# Patient Record
Sex: FEMALE | Race: WHITE | NOT HISPANIC OR LATINO | Employment: OTHER | URBAN - METROPOLITAN AREA
[De-identification: names, ages, dates, MRNs, and addresses within clinical notes are randomized per-mention and may not be internally consistent; named-entity substitution may affect disease eponyms.]

---

## 2017-02-22 ENCOUNTER — APPOINTMENT (OUTPATIENT)
Dept: WOMENS IMAGING | Facility: HOSPITAL | Age: 67
End: 2017-02-22

## 2017-02-22 PROCEDURE — 77067 SCR MAMMO BI INCL CAD: CPT | Performed by: RADIOLOGY

## 2017-02-22 PROCEDURE — G0202 SCR MAMMO BI INCL CAD: HCPCS | Performed by: RADIOLOGY

## 2018-02-27 ENCOUNTER — APPOINTMENT (OUTPATIENT)
Dept: WOMENS IMAGING | Facility: HOSPITAL | Age: 68
End: 2018-02-27

## 2018-02-27 PROCEDURE — 77063 BREAST TOMOSYNTHESIS BI: CPT | Performed by: RADIOLOGY

## 2018-02-27 PROCEDURE — 77067 SCR MAMMO BI INCL CAD: CPT | Performed by: RADIOLOGY

## 2019-02-28 ENCOUNTER — APPOINTMENT (OUTPATIENT)
Dept: WOMENS IMAGING | Facility: HOSPITAL | Age: 69
End: 2019-02-28

## 2019-02-28 PROCEDURE — 77067 SCR MAMMO BI INCL CAD: CPT | Performed by: RADIOLOGY

## 2019-02-28 PROCEDURE — 77063 BREAST TOMOSYNTHESIS BI: CPT | Performed by: RADIOLOGY

## 2020-03-02 ENCOUNTER — APPOINTMENT (OUTPATIENT)
Dept: WOMENS IMAGING | Facility: HOSPITAL | Age: 70
End: 2020-03-02

## 2020-03-02 PROCEDURE — 77063 BREAST TOMOSYNTHESIS BI: CPT | Performed by: RADIOLOGY

## 2020-03-02 PROCEDURE — 77067 SCR MAMMO BI INCL CAD: CPT | Performed by: RADIOLOGY

## 2021-03-12 ENCOUNTER — OFFICE VISIT (OUTPATIENT)
Dept: NEUROLOGY | Facility: CLINIC | Age: 71
End: 2021-03-12

## 2021-03-12 VITALS
SYSTOLIC BLOOD PRESSURE: 148 MMHG | BODY MASS INDEX: 24.92 KG/M2 | HEART RATE: 77 BPM | WEIGHT: 178 LBS | OXYGEN SATURATION: 97 % | DIASTOLIC BLOOD PRESSURE: 72 MMHG | HEIGHT: 71 IN

## 2021-03-12 DIAGNOSIS — R20.2 NUMBNESS AND TINGLING OF LEFT ARM AND LEG: Primary | ICD-10-CM

## 2021-03-12 DIAGNOSIS — R20.0 NUMBNESS AND TINGLING OF LEFT ARM AND LEG: Primary | ICD-10-CM

## 2021-03-12 PROCEDURE — 99203 OFFICE O/P NEW LOW 30 MIN: CPT | Performed by: PSYCHIATRY & NEUROLOGY

## 2021-03-12 RX ORDER — LEVOTHYROXINE SODIUM 0.05 MG/1
TABLET ORAL
COMMUNITY
Start: 2020-12-14

## 2021-03-12 RX ORDER — METOPROLOL SUCCINATE 50 MG/1
TABLET, EXTENDED RELEASE ORAL
COMMUNITY
Start: 2020-11-30

## 2021-03-12 RX ORDER — MELATONIN
1000 DAILY
COMMUNITY

## 2021-03-12 NOTE — PROGRESS NOTES
Chief Complaint  Neurologic Problem (feels tingling all over, began about a year ago, reports having hip and ankle work completed, hasnt had any testing)    Subjective     {CC  Problem List  Visit Diagnosis   Encounters  Notes  Medications  Labs  Result Review Imaging  Media :23}     Nelida Dye presents to [facility]UR[facility] for   HISTORY OF PRESENT ILLNESS:    Nelida Dye is a 70 year old right handed woman who presents to neurology clinic for initial evaluation and treatment of tingling.  She tells me her symptoms started over a year ago with tingling in the left leg mostly and also in the left arm which started around 11/2020.  She has had hip replacement in 2001.  She has also had plates and pins in her left ankle in 2008.  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  She tells me she was doing a lot of lifting in 11/2020.  She denies history of migraines. She tells me she has pain across her shoulder and in her neck due to how she is sitting and working with the children. She denies a shooting, radiating pain down from her neck down her arms.  She has tingling but not shooting pain in her left leg more so than her right leg.  She denies history of diabetes.  She does have hypothyroidism and is on levothyroxine.  She has had lab work including CMP, HgbA1c, TSH, Vit B12 and Vit D which looked good.      Past Medical History:   Diagnosis Date   • Asthma         History reviewed. No pertinent family history.     Social History     Socioeconomic History   • Marital status:      Spouse name: Not on file   • Number of children: Not on file   • Years of education: Not on file   • Highest education level: Not on file   Tobacco Use   • Smoking status: Never Smoker   Vaping Use   • Vaping Use: Never used   Substance and Sexual Activity   • Drug use: Never   • Sexual activity: Defer        I have  "personally reviewed the ROS as stated below.     Review of Systems   Constitutional: Positive for fatigue. Negative for activity change and appetite change.   HENT: Negative for hearing loss, trouble swallowing and voice change.    Eyes: Negative for blurred vision, double vision and pain.   Respiratory: Negative for choking, shortness of breath and wheezing.    Cardiovascular: Negative for chest pain, palpitations and leg swelling.   Gastrointestinal: Negative for abdominal pain, nausea, rectal pain and vomiting.   Endocrine: Negative for cold intolerance, heat intolerance and polydipsia.   Genitourinary: Positive for urgency. Negative for decreased urine volume and urinary incontinence.   Musculoskeletal: Negative for back pain, gait problem and neck pain.   Skin: Negative for dry skin, rash and bruise.   Allergic/Immunologic: Positive for environmental allergies (walnut,mold,dust). Negative for food allergies.   Neurological: Positive for numbness (hands/fingers) and headache. Negative for dizziness, tremors, seizures, syncope, facial asymmetry, speech difficulty, weakness, light-headedness, memory problem and confusion.   Hematological: Does not bruise/bleed easily.   Psychiatric/Behavioral: Negative for agitation, behavioral problems, decreased concentration, dysphoric mood, hallucinations, self-injury, sleep disturbance, suicidal ideas, negative for hyperactivity, depressed mood and stress. The patient is not nervous/anxious.         Objective   Vital Signs:   /72   Pulse 77   Ht 180.3 cm (71\")   Wt 80.7 kg (178 lb)   SpO2 97%   BMI 24.83 kg/m²       PHYSICAL EXAM:    General   Mental Status - Alert. General Appearance - Well developed, Well groomed, Oriented and Cooperative. Orientation - Oriented X3.       Head and Neck  Head - normocephalic, atraumatic with no lesions or palpable masses.  Neck    Global Assessment - supple.       Eye   Sclera/Conjunctiva - Bilateral - Normal.    ENMT  Mouth and " Throat   Oral Cavity/Oropharynx: Oropharynx - the soft palate,uvula and tongue are normal in appearance.    Chest and Lung Exam   Chest - lung clear to auscultation bilaterally.    Cardiovascular   Cardiovascular examination reveals  - normal heart sounds, regular rate and rhythm.    Neurologic   Mental Status: Speech - Normal. Cognitive function - appropriate fund of knowledge. No impairment of attention, Impairment of concentration, impairment of long term memory or impairment of short term memory.  Cranial Nerves:   II Optic: Visual acuity - Left - Normal. Right - Normal. Visual fields - Normal (to confrontation).  III Oculomotor: Pupillary constriction - Left - Normal. Right - Normal.  VII Facial: - Normal Bilaterally.  VIII Acoustic - Bilateral - Hearing normal and (Hearing tested by finger rub).   IX Glossopharyngeal / X Vagus - Normal.  XI Accessory: Trapezius - Bilateral - Normal. Sternocleidomastoid - Bilateral - Normal.  XII Hypoglossal - Bilateral - Normal.  Eye Movements: - Normal Bilaterally.  Sensory:   Light Touch: Intact - Globally.  Motor:   Bulk and Contour: - Normal.  Tone: - Normal.  Tremor: Not present.  Strength: 5/5 normal muscle strength - All Muscles.   General Assessment of Reflexes: - deep tendon reflexes are normal. Coordination - No Impairment of finger-to-nose or Impairment of rapid alternating movements. Gait - Normal.       Result Review :                 Assessment and Plan    Problem List Items Addressed This Visit        Symptoms and Signs    Numbness and tingling of left arm and leg - Primary    Current Assessment & Plan     70 year old woman with left sided numbness and tingling including left arm and left leg.  I will check NCS of her left arm and left leg for further evaluation of potential nerve damage causing her symptoms.  Otherwise this maybe stress/anxiety related.  She has had appropriate lab work done which I reviewed and looks good.  Will follow up after the testing.                  I spent 33 minutes caring for Nelida on this date of service. This time includes time spent by me in the following activities:preparing for the visit, reviewing tests, obtaining and/or reviewing a separately obtained history, performing a medically appropriate examination and/or evaluation , counseling and educating the patient/family/caregiver, ordering medications, tests, or procedures, documenting information in the medical record and care coordination    Follow Up   No follow-ups on file.  Patient was given instructions and counseling regarding her condition or for health maintenance advice. Please see specific information pulled into the AVS if appropriate.

## 2021-03-12 NOTE — ASSESSMENT & PLAN NOTE
70 year old woman with left sided numbness and tingling including left arm and left leg.  I will order a NCS of her left arm and left leg for further evaluation of potential nerve damage causing her symptoms.  Otherwise this maybe stress/anxiety related.  She has had appropriate lab work done which I reviewed and looks good.  Will follow up after the testing.

## 2021-03-22 ENCOUNTER — BULK ORDERING (OUTPATIENT)
Dept: CASE MANAGEMENT | Facility: OTHER | Age: 71
End: 2021-03-22

## 2021-03-22 DIAGNOSIS — Z23 IMMUNIZATION DUE: ICD-10-CM

## 2021-04-12 ENCOUNTER — APPOINTMENT (OUTPATIENT)
Dept: WOMENS IMAGING | Facility: HOSPITAL | Age: 71
End: 2021-04-12

## 2021-04-12 PROCEDURE — 77067 SCR MAMMO BI INCL CAD: CPT | Performed by: RADIOLOGY

## 2021-04-12 PROCEDURE — 77063 BREAST TOMOSYNTHESIS BI: CPT | Performed by: RADIOLOGY

## 2021-04-20 DIAGNOSIS — R20.0 NUMBNESS AND TINGLING OF LEFT ARM AND LEG: Primary | ICD-10-CM

## 2021-04-20 DIAGNOSIS — R20.2 NUMBNESS AND TINGLING OF LEFT ARM AND LEG: Primary | ICD-10-CM

## 2021-06-04 ENCOUNTER — APPOINTMENT (OUTPATIENT)
Dept: INFUSION THERAPY | Facility: HOSPITAL | Age: 71
End: 2021-06-04

## 2021-07-09 ENCOUNTER — HOSPITAL ENCOUNTER (OUTPATIENT)
Dept: INFUSION THERAPY | Facility: HOSPITAL | Age: 71
Discharge: HOME | End: 2021-07-09
Admitting: PSYCHIATRY & NEUROLOGY

## 2021-07-09 DIAGNOSIS — R20.0 NUMBNESS AND TINGLING OF LEFT ARM AND LEG: ICD-10-CM

## 2021-07-09 DIAGNOSIS — R20.2 NUMBNESS AND TINGLING OF LEFT ARM AND LEG: ICD-10-CM

## 2021-07-09 PROCEDURE — 95886 MUSC TEST DONE W/N TEST COMP: CPT | Performed by: PSYCHIATRY & NEUROLOGY

## 2021-07-09 PROCEDURE — 95886 MUSC TEST DONE W/N TEST COMP: CPT

## 2021-07-09 PROCEDURE — 95911 NRV CNDJ TEST 9-10 STUDIES: CPT

## 2021-07-09 PROCEDURE — 95911 NRV CNDJ TEST 9-10 STUDIES: CPT | Performed by: PSYCHIATRY & NEUROLOGY

## 2021-07-09 NOTE — PROCEDURES
EMG and Nerve Conduction Studies    I.      Instrument used: Neuromax 1002  II.     Please see data sheets for tabular summary of NCS and details on methods, temperatures and lab standards.   III.    EMG muscles tested for upper extremity studies include the deltoid, biceps, triceps, pronator teres, extensor digitorum communis, first dorsal interosseous and abductor pollicis brevis.    IV.   EMG muscles tested for lower extremity studies include the vastus lateralis, tibialis anterior, peroneus longus, medial gastrocnemius and extensor digitorum brevis.    V.    Additional muscles tested as needed.  Paraspinal muscles tested as needed.   VI.   Please see data sheets for tabular summary of EMG findings.   VII. The complete report includes the data sheets.      Indication: Paresthesias in the left arm and left leg  History: 70-year-old white female with intermittent numbness and tingling in the left arm and left leg.  The numbness is mostly in the fingers otherwise tingling is in other locations.  She points to the elbow area, volar aspect as well as in the left leg from the knee down      Ht: 180.3 cm  Wt: 80.7 kg  HbA1C:   Lab Results   Component Value Date    HGBA1C 5.1 06/29/2021     TSH:   Lab Results   Component Value Date    TSH 2.270 02/02/2021       Technical summary:  Nerve conduction studies were obtained in the left arm and left leg.  Skin temperatures were at least 32 °C measured on the palm after warming the hand.  Temperatures were a bit cooler on the foot at the ankle however distal latencies were normal so temperature correction was not needed.  Needle examination was obtained on selected muscles of the left arm and left leg.    Results:  1.  Normal left median antidromic sensory distal latency and amplitude.  2.  Normal left ulnar antidromic sensory distal latency and amplitude.  3.  Normal left radial sensory distal latency and amplitude.  4.  Normal left median motor conduction velocity, distal  latency and amplitudes.  5.  Slow left ulnar motor conduction velocity in the short segment across the elbow at 45.5 m/s with normal velocity below the elbow.  Normal distal latency and amplitudes.  Normal right ulnar motor conduction velocities, distal latency and amplitudes.  6.  Normal left sural sensory distal latency and amplitude.  7.  Normal left superficial peroneal sensory distal latency and amplitude.  8.  Normal left peroneal motor conduction velocities, distal latency and amplitudes.  9.  Normal left tibial motor conduction velocity, distal latency and amplitudes.  10.  Needle examination of selected muscles in the left arm and left leg showed normal insertional activities throughout.  There were normal motor units and recruitment patterns throughout.    Impression:  Abnormal study showing a moderate left ulnar neuropathy at the elbow.  The comparison right ulnar motor study was normal.  There was no evidence of a left median neuropathy, more diffuse polyneuropathy or a left cervical or lumbosacral radiculopathy by this study.  Study results were discussed with the patient.    Antoine Day M.D.              Dictated utilizing Dragon dictation.

## 2022-04-13 ENCOUNTER — APPOINTMENT (OUTPATIENT)
Dept: WOMENS IMAGING | Facility: HOSPITAL | Age: 72
End: 2022-04-13

## 2022-04-13 PROCEDURE — 77063 BREAST TOMOSYNTHESIS BI: CPT | Performed by: RADIOLOGY

## 2022-04-13 PROCEDURE — 77067 SCR MAMMO BI INCL CAD: CPT | Performed by: RADIOLOGY

## 2023-04-14 ENCOUNTER — TELEPHONE (OUTPATIENT)
Dept: GASTROENTEROLOGY | Facility: CLINIC | Age: 73
End: 2023-04-14
Payer: MEDICARE

## 2023-04-14 ENCOUNTER — APPOINTMENT (OUTPATIENT)
Dept: WOMENS IMAGING | Facility: HOSPITAL | Age: 73
End: 2023-04-14
Payer: MEDICARE

## 2023-04-14 PROCEDURE — 77063 BREAST TOMOSYNTHESIS BI: CPT | Performed by: RADIOLOGY

## 2023-04-14 PROCEDURE — 77067 SCR MAMMO BI INCL CAD: CPT | Performed by: RADIOLOGY

## 2023-04-17 ENCOUNTER — PREP FOR SURGERY (OUTPATIENT)
Dept: SURGERY | Facility: SURGERY CENTER | Age: 73
End: 2023-04-17
Payer: MEDICARE

## 2023-04-17 DIAGNOSIS — Z12.11 ENCOUNTER FOR SCREENING FOR MALIGNANT NEOPLASM OF COLON: Primary | ICD-10-CM

## 2023-04-17 RX ORDER — SODIUM CHLORIDE, SODIUM LACTATE, POTASSIUM CHLORIDE, CALCIUM CHLORIDE 600; 310; 30; 20 MG/100ML; MG/100ML; MG/100ML; MG/100ML
30 INJECTION, SOLUTION INTRAVENOUS CONTINUOUS PRN
OUTPATIENT
Start: 2023-04-17

## 2023-04-26 PROBLEM — Z12.11 ENCOUNTER FOR SCREENING FOR MALIGNANT NEOPLASM OF COLON: Status: ACTIVE | Noted: 2023-04-26

## 2023-09-15 RX ORDER — ATORVASTATIN CALCIUM 10 MG/1
10 TABLET, FILM COATED ORAL DAILY
COMMUNITY

## 2023-09-15 RX ORDER — LISINOPRIL 10 MG/1
10 TABLET ORAL DAILY
COMMUNITY

## 2023-09-18 ENCOUNTER — HOSPITAL ENCOUNTER (OUTPATIENT)
Facility: SURGERY CENTER | Age: 73
Setting detail: HOSPITAL OUTPATIENT SURGERY
Discharge: HOME | End: 2023-09-18
Attending: INTERNAL MEDICINE | Admitting: INTERNAL MEDICINE
Payer: MEDICARE

## 2023-09-18 ENCOUNTER — ANESTHESIA EVENT (OUTPATIENT)
Dept: SURGERY | Facility: SURGERY CENTER | Age: 73
End: 2023-09-18
Payer: MEDICARE

## 2023-09-18 ENCOUNTER — ANESTHESIA (OUTPATIENT)
Dept: SURGERY | Facility: SURGERY CENTER | Age: 73
End: 2023-09-18
Payer: MEDICARE

## 2023-09-18 VITALS
HEIGHT: 71 IN | WEIGHT: 172.8 LBS | RESPIRATION RATE: 16 BRPM | OXYGEN SATURATION: 96 % | DIASTOLIC BLOOD PRESSURE: 57 MMHG | HEART RATE: 68 BPM | TEMPERATURE: 97.8 F | BODY MASS INDEX: 24.19 KG/M2 | SYSTOLIC BLOOD PRESSURE: 118 MMHG

## 2023-09-18 DIAGNOSIS — Z12.11 ENCOUNTER FOR SCREENING FOR MALIGNANT NEOPLASM OF COLON: ICD-10-CM

## 2023-09-18 PROCEDURE — 25010000002 PROPOFOL 1000 MG/100ML EMULSION: Performed by: STUDENT IN AN ORGANIZED HEALTH CARE EDUCATION/TRAINING PROGRAM

## 2023-09-18 PROCEDURE — G0105 COLORECTAL SCRN; HI RISK IND: HCPCS | Performed by: INTERNAL MEDICINE

## 2023-09-18 RX ORDER — LIDOCAINE HYDROCHLORIDE 20 MG/ML
INJECTION, SOLUTION EPIDURAL; INFILTRATION; INTRACAUDAL; PERINEURAL AS NEEDED
Status: DISCONTINUED | OUTPATIENT
Start: 2023-09-18 | End: 2023-09-18 | Stop reason: SURG

## 2023-09-18 RX ORDER — PROPOFOL 10 MG/ML
INJECTION, EMULSION INTRAVENOUS AS NEEDED
Status: DISCONTINUED | OUTPATIENT
Start: 2023-09-18 | End: 2023-09-18 | Stop reason: SURG

## 2023-09-18 RX ORDER — SODIUM CHLORIDE, SODIUM LACTATE, POTASSIUM CHLORIDE, CALCIUM CHLORIDE 600; 310; 30; 20 MG/100ML; MG/100ML; MG/100ML; MG/100ML
30 INJECTION, SOLUTION INTRAVENOUS CONTINUOUS PRN
Status: DISCONTINUED | OUTPATIENT
Start: 2023-09-18 | End: 2023-09-18 | Stop reason: HOSPADM

## 2023-09-18 RX ADMIN — PROPOFOL 50 MG: 10 INJECTION, EMULSION INTRAVENOUS at 07:59

## 2023-09-18 RX ADMIN — PROPOFOL 50 MG: 10 INJECTION, EMULSION INTRAVENOUS at 07:56

## 2023-09-18 RX ADMIN — PROPOFOL 140 MCG/KG/MIN: 10 INJECTION, EMULSION INTRAVENOUS at 07:58

## 2023-09-18 RX ADMIN — PROPOFOL 50 MG: 10 INJECTION, EMULSION INTRAVENOUS at 07:57

## 2023-09-18 RX ADMIN — SODIUM CHLORIDE, POTASSIUM CHLORIDE, SODIUM LACTATE AND CALCIUM CHLORIDE 30 ML/HR: 600; 310; 30; 20 INJECTION, SOLUTION INTRAVENOUS at 07:04

## 2023-09-18 RX ADMIN — LIDOCAINE HYDROCHLORIDE 40 MG: 20 INJECTION, SOLUTION EPIDURAL; INFILTRATION; INTRACAUDAL; PERINEURAL at 07:56

## 2023-09-18 NOTE — ANESTHESIA POSTPROCEDURE EVALUATION
Patient: Nelida Dye    Procedure Summary       Date: 09/18/23 Room / Location: [facility] / [facility]    Anesthesia Start: 0753 Anesthesia Stop: 0814    Procedure: COLONOSCOPY TO CECUM Diagnosis:       Encounter for screening for malignant neoplasm of colon      (Encounter for screening for malignant neoplasm of colon [Z12.11])    Surgeons: Amarjit Lopez MD Provider: Mary Markham MD    Anesthesia Type: MAC ASA Status: 2            Anesthesia Type: MAC    Vitals  Vitals Value Taken Time   /57 09/18/23 0832   Temp 36.6 °C (97.8 °F) 09/18/23 0812   Pulse 68 09/18/23 0832   Resp 16 09/18/23 0832   SpO2 96 % 09/18/23 0832           Post Anesthesia Care and Evaluation    Patient location during evaluation: PHASE II  Level of consciousness: awake  Pain management: adequate    Airway patency: patent  Anesthetic complications: No anesthetic complications    Cardiovascular status: acceptable  Respiratory status: acceptable  Hydration status: acceptable

## 2023-09-18 NOTE — H&P
"No chief complaint on file.      HPI  H/o polyps         Problem List:    Patient Active Problem List   Diagnosis    Numbness and tingling of left arm and leg    Encounter for screening for malignant neoplasm of colon       Medical History:    Past Medical History:   Diagnosis Date    Asthma     Colon polyps     Disease of thyroid gland     Hyperlipidemia     Hypertension         Social History:    Social History     Socioeconomic History    Marital status:    Tobacco Use    Smoking status: Never   Vaping Use    Vaping Use: Never used   Substance and Sexual Activity    Alcohol use: Yes     Alcohol/week: 4.0 standard drinks     Types: 4 Cans of beer per week    Drug use: Never    Sexual activity: Defer       Family History:   Family History   Family history unknown: Yes       Surgical History:   Past Surgical History:   Procedure Laterality Date    ANKLE SURGERY      COLONOSCOPY      TOTAL HIP ARTHROPLASTY           Current Facility-Administered Medications:     lactated ringers infusion, 30 mL/hr, Intravenous, Continuous PRN, Amarjit Lopez MD, Last Rate: 30 mL/hr at 09/18/23 0704, 30 mL/hr at 09/18/23 0704    Allergies:   Allergies   Allergen Reactions    Hydrochlorothiazide Anaphylaxis    Amlodipine Dizziness    Other Other (See Comments)     All \"emir\"  Rapid heart rate  Also allergic to steroids  Also allergic to mycins    Propoxyphene Hives     edema    Benzonatate Unknown - Low Severity     Build up of uric acid (gout)     Celecoxib Other (See Comments)     Slurred speech, muscle weakness 9 couldn't walk) and rash    Codeine Other (See Comments)     Had @ 11 months along with sulfa drugs and had anaphylactic reaction to one or the other...for sure anaphylactic with sulfa as raj had since.. Not sure with codeine     Colchicine Other (See Comments)     Rash     Fenofibrate Other (See Comments)     Elevated liver levels     Hydralazine Hives     Rapid heart rate    Penicillins Other (See Comments)    " " Severe rash on face and neck (looked like severe psoriasis) hives     Rosuvastatin Other (See Comments)     Has \"S\" in chemical structure indicative sulfa     Sulfa Antibiotics Other (See Comments)     Anaphylactic       Tetracaine Other (See Comments)     Racing heart  All \"emir\"    Valsartan Other (See Comments)     Lightheaded, feeling faint, achy, flu-like, tightness in chest    Alendronate Anxiety     Rash     Bactrim [Sulfamethoxazole-Trimethoprim] Anxiety        The following portions of the patient's history were reviewed by me and updated as appropriate: review of systems, allergies, current medications, past family history, past medical history, past social history, past surgical history and problem list.    Vitals:    09/18/23 0700   BP: 147/75   Pulse: 79   Resp: 16   Temp: 97.6 °F (36.4 °C)   SpO2: 97%       PHYSICAL EXAM:    CONSTITUTIONAL:  today's vital signs reviewed by me  GASTROINTESTINAL: abdomen is soft nontender nondistended with normal active bowel sounds, no masses are appreciated    Assessment/ Plan  H/o polyps    colonoscopy    Risks and benefits as well as alternatives to endoscopic evaluation were explained to the patient and they voiced understanding and wish to proceed.  These risks include but are not limited to the risk of bleeding, perforation, adverse reaction to sedation, and missed lesions.  The patient was given the opportunity to ask questions prior to the endoscopic procedure.           "

## 2023-09-18 NOTE — ANESTHESIA PREPROCEDURE EVALUATION
Anesthesia Evaluation     Patient summary reviewed and Nursing notes reviewed   no history of anesthetic complications:   NPO Solid Status: > 2 hours  NPO Liquid Status: > 8 hours           Airway   Mallampati: II  TM distance: >3 FB  Neck ROM: full  Dental      Comment: Cap and inplants      Pulmonary    (-) COPD, asthma  Cardiovascular   Exercise tolerance: good (4-7 METS)    Patient on routine beta blocker  Rhythm: regular    (+) hypertension, hyperlipidemia  (-) past MI, angina, cardiac stents      Neuro/Psych  (-) seizures, CVA  GI/Hepatic/Renal/Endo    (+) thyroid problem   (-) GERD, liver disease, no renal disease    Musculoskeletal     Abdominal    Substance History      OB/GYN          Other - negative ROS                       Anesthesia Plan    ASA 2     MAC     (I have reviewed the patient's history and chart with the patient, including all pertinent laboratory results and imaging. I have explained the risks of monitored anesthesia care including but not limited to respiratory depression, possible need for airway intervention, or possible intra-op recall. )  intravenous induction     Anesthetic plan, risks, benefits, and alternatives have been provided, discussed and informed consent has been obtained with: patient.  Pre-procedure education provided    CODE STATUS:

## 2024-04-15 ENCOUNTER — APPOINTMENT (OUTPATIENT)
Dept: WOMENS IMAGING | Facility: HOSPITAL | Age: 74
End: 2024-04-15
Payer: MEDICARE

## 2024-04-15 PROCEDURE — 77063 BREAST TOMOSYNTHESIS BI: CPT | Performed by: RADIOLOGY

## 2024-04-15 PROCEDURE — 77067 SCR MAMMO BI INCL CAD: CPT | Performed by: RADIOLOGY

## 2025-04-17 ENCOUNTER — APPOINTMENT (OUTPATIENT)
Dept: WOMENS IMAGING | Facility: HOSPITAL | Age: 75
End: 2025-04-17
Payer: MEDICARE

## 2025-04-17 PROCEDURE — 77063 BREAST TOMOSYNTHESIS BI: CPT | Performed by: RADIOLOGY

## 2025-04-17 PROCEDURE — 77067 SCR MAMMO BI INCL CAD: CPT | Performed by: RADIOLOGY

## (undated) DEVICE — Device

## (undated) DEVICE — FLEX ADVANTAGE 1500CC: Brand: FLEX ADVANTAGE

## (undated) DEVICE — CANN O2 ETCO2 FITS ALL CONN CO2 SMPL A/ 7IN DISP LF

## (undated) DEVICE — GOWN PROC ENDOARMOR GI LVL3 HY/SHLD UNIV

## (undated) DEVICE — ADAPT CLN SCPE ENDO PORPOISE BX/50 DISP

## (undated) DEVICE — KT ORCA ORCAPOD DISP STRL